# Patient Record
(demographics unavailable — no encounter records)

---

## 2024-12-09 NOTE — PHYSICAL EXAM
[Appropriately responsive] : appropriately responsive [Alert] : alert [No Acute Distress] : no acute distress [No Lymphadenopathy] : no lymphadenopathy [Soft] : soft [Non-tender] : non-tender [Non-distended] : non-distended [No HSM] : No HSM [No Lesions] : no lesions [No Mass] : no mass [Oriented x3] : oriented x3 [Examination Of The Breasts] : a normal appearance [No Masses] : no breast masses were palpable [Labia Majora] : normal [Labia Minora] : normal [Normal] : normal [Moderate] : There was moderate vaginal bleeding

## 2024-12-09 NOTE — HISTORY OF PRESENT ILLNESS
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 This month  received menses  2 x this month, 1.5 weeks later after menses. has h/o  periods with clots heavy and painful and it seems to be returning now after she stopped tamoxifen. She has h/o fibroid and is very worried will start again. She is very tense during gyn exam. Was in ed and had sono - TA - showed  3.9 cm myoma , Never sexually active   H/O DCIS, RADIATION, TAMOXIFEN FOR5 YERAS, WILL SEEE DR PORTILLO THIS MONTH  [TextBox_4] : GYNHX  history of fibroid, cysts, or STDs never a pap  dr Graham 2022 [Mammogramdate] : 5-2024 [PapSmeardate] : 2022 [LMPDate] : 11-

## 2024-12-09 NOTE — DISCUSSION/SUMMARY
[FreeTextEntry1] : 48 yo p0 annual , menorrhagia, h/o fibroids, h/o DCIS , [facility] MRI TO DO for myoma location hormonal w/up day 2-3 mammo options of bleeding management d/w patient including but not limited to: tranexamic acid  d &C hysto with cytotec night before  ablation if endometrial biopsy benign and no intrauterine myoma  ufe hysterectomy will schedule d &C hysteroscopy and pap the time of procedure

## 2024-12-09 NOTE — DISCUSSION/SUMMARY
[FreeTextEntry1] : 46 yo p0 annual , menorrhagia, h/o fibroids, h/o DCIS , [facility] MRI TO DO for myoma location hormonal w/up day 2-3 mammo options of bleeding management d/w patient including but not limited to: tranexamic acid  d &C hysto with cytotec night before  ablation if endometrial biopsy benign and no intrauterine myoma  ufe hysterectomy will schedule d &C hysteroscopy and pap the time of procedure

## 2025-02-19 NOTE — ASSESSMENT
[FreeTextEntry1] : Left breast DCIS  ER/IN pos, s/p L WLE.  Iron deficiency secondary to menorrhagia.   Assessment and Plan: -- She has completed Tamoxifen x 5 years on 10/2024. -- Breast exam today showed stable post surgical change at the lumpectomy site. Reviewed Bilateral screening mammogram on 5/16/2024 which showed no mammographic evidence of malignancy. MR breast on 11/24/24 showed no suspicious findings. She is due for bilateral screening mammo on 5/2025, script given. -- Menorrhagia; transvaginal US showed fibroids. MR pelvis on 12/26/24 showed fibroid uterus. She is scheduled for D&C with Dr Gutierrez on 2/28/25. F/U with Dr Gutierrez as recommended. -- Iron deficiency. S/P Iron infusion on 4/2022. Continue FeSO4 325 mg every other day. Blood work on 1/29/25 reviewed. No need for Iron infusion at this time. Will repeat CBC and Iron studies in 3 months. -- Follow up with Breast Surgeon. -- Follow up with PCP and GYN for health maintenance. -- Advised to f/u with GI for screening colonoscopy. -- RTO for followup in 6 months.

## 2025-02-19 NOTE — HISTORY OF PRESENT ILLNESS
[de-identified] : 41 female was referred by Dr. Rice for consultation of adjuvant endocrine therapy for left breast DCIS.\par  The patient has no major PMH except for hypothyroidism on synthroid , and anxiety on lexapro. PSH negative. FH positive for ovarian cancer in her mother at age 76 ( No genetic tests performed) . She was never pregnant , and she uses OCP x 16-17 years, stopped prior to her surgery, 7/2019 . She has regular menses, LMP 2 weeks ago.\par  \par  Workup as follow :\par  4/22/2019  b/l screening mammogram : heterogenously dense breasts with calcifications in posterior 1/3rd, lateral L breast . no suspicious mass, calcifications or other abnormalities in R. BIRADS 0\par  4/27/19 -- L dx mammogram showed central L breast, posterior depth, heterogenous calcifications in linear distribution, spanning 1.4 cm. BIRADS 4: rec L stereotactic guided biopsy \par  5/16/19 -- L stereotactic guided biopsy  showed DCIS, ER/MI pos.\par  6/5/19 -- b/l breast MRI\par  -R: linear nonmass enhancement in the central R breast, measuring 2.9 cm --> rec BIOPSY \par  -L: clip is laterally displaced from the biopsy site, 2.3 cm from residual calcifications; on region of calcifications, trace residual nonmass enhancement\par  \par  6/13/19 -- R MRI biopsy : benign proliferative type FC changes \par  \par  6/16/19 -- COLOR genetic testing \par  -no mutations identified\par  \par  7/1/19 -- L WLE \par  -DCIS intermediate nuclear grade \par  -ER/MI + (Thai 8/5)\par  -carcinoma is close to inferior and medial surgical margin (0.2 mm)\par  -additional medial margin -- focal ADH \par  -additional inferior margin -- negative for carcinoma \par  \par  The patient has no major complaints today , but reported that she had trace blood on a urine analysis that was obtained 2 weeks ago while she had her period , and she is concerned about bladder cancer . She denies urinary symptoms except for some burning , which resolved now .\par  \par   [de-identified] : 11/11/19 Patient is here today for follow up visit.  She has been on Tamoxifen daily since 10/15/19 and tolerated well. She completed adjuvant whole breast radiation #16 on 9/4/19. She reports lower back pain last week which resolved on its own.  LMP 11/3/19 regular menses.  She is due to have her left mammogram on 1/2020 and MRI breasts on 4/2020.  2/19/2020: TIKI MCNAIR is a 42 year old female here today for follow up visit. She has been taking tamoxifen daily since 10/15/2019. She denies any new breast symptoms at this time. She complains of frequent respiratory infections. She has stopped her birth control after diagnosis as per Dr. Rice. She complains of menorrhagia cycle lasting about 4 days.In addition she complains of fatigue and breast tenderness at site of radiation. Follows up with gyn regularly. New left breast skin lesion at 5 oclock location noted advised; non tender.  5/13/2020: The patient is evaluated via virtual telehealth. She has ER/NY positive DCIS in the breast, s/p lumpectomy in 7/2019. She has sea taking tamoxifen daily since 10/2019 and tolerated well. Her periods are on time and heavy. Blood work from 2/2020 showed normal Hb and normal iron study. She had repeat b/l dx mammo and US on 4/24/2020. There was no suspicious finding. She does not have breast related symptoms. Her father passed away from [place] [id number] and she feels very sad.   8/12/2020: 2/19/2020: TIKI MCNAIR is a 42 year old female here today for follow up visit for ER/NY positive DCIS in the breast, s/p lumpectomy in 7/2019. She has been taking tamoxifen daily since 10/2019. She denies any new breast symptoms at this time. She had repeat b/l dx mammo and US on 4/24/2020. There was no suspicious finding.  MRI completed June 2020 which was unremarkable. She has stopped her birth control after diagnosis as per Dr. Rice. She complains of menorrhagia cycle lasting about 4 days. hgb stable; ferritin normal. Follows up with gyn regularly. In addition, she complains of intermittent pain in the right scapula with belching or hiccup; imaging of the RUQ abdomen was unremarkable.   03/29/2021: TIKI is here for follow up visit for ER/NY positive DCIS in the breast, s/p lumpectomy in 7/2019. She has been taking tamoxifen daily since 10/2019. She is feeling well and does not have new complains. She had b/l dx mammo on 4/27/21 which showed stable postlumpectomy changes and no suspicious finding. She still has menorrhagias lasting about 4 days. She went for a transvaginal sono which showed fibroids. Hb remained within normal range. Serum Ferritin is low at 12 ng/ml. % saturation is borderline at 15%. Serum Fe is normal 73 ug/dl, TIBC is elevated.  She is taking FeSO4 325 mg three times per week.   4/7/22 TIKI is here for follow up visit for ER/NY positive DCIS in the breast, s/p lumpectomy in 7/2019. She has been taking tamoxifen daily since 10/2019. She offers no breast-related complaints. Last mammogram and breast US done 10/2021 which showed no mammographic/sonographic evidence of malignancy. She complains heavier menses monthly, using 24 pads in 4 days. She has been taking iron pills 3x/week. She did not tolerate oral iron supplement well with abdominal upset and occasional diarrhea.   1/16/23 TIKI is here for follow up visit for ER/NY positive DCIS in the breast, s/p lumpectomy in 7/2019. She has been taking tamoxifen daily since 10/2019. She offers no breast-related complaints. Last mammogram and breast US done 4/2022 which showed no mammographic/sonographic evidence of malignancy. She still complains heavier menses monthly, using 24 pads in 4 days. She has been taking iron pills 3x/week. She did not tolerate oral iron supplement well with abdominal upset and occasional diarrhea.   7/3/23 TIKI is here for follow up visit for ER/NY positive DCIS in the left breast, s/p lumpectomy in 7/2019. She has been taking Tamoxifen daily since 10/2019. She offers no breast-related complaints. Last mammogram was done on 5/2023 which showed no mammographic/sonographic evidence of malignancy. She still complains heavier menses monthly, using 24 pads in 4 days. She has not seen gyne in the last 2 1/2 years. She has been taking iron pills 3x/week, not regularly because it causes GI upset. She has increased her intake of Iron rich foods. Last Iron infusion was 4/2022.  2/19/25 TIKI is here for follow up visit for ER/NY positive DCIS in the left breast, s/p lumpectomy in 7/2019. She completed 5 years of Tamoxifen in 10/2024. Bilateral screening mammogram on 5/16/2024 showed no mammographic evidence of malignancy. MR breast on 11/24/24 showed no suspicious findings. She still complains of heavy menses x 4-5 days. During the second month off Tamoxifen, she had menses twice in a month.  She follows up with Dr Gutierrez. MR pelvic was done on 12/26/24 which showed fibroid uterus and she is scheduled for D&C on 2/28/'25. She has been taking iron pills every other day. Blood work results on 1/29/25 reviewed with her. She feels well, denies any new breast symptoms.

## 2025-02-19 NOTE — PHYSICAL EXAM
[Fully active, able to carry on all pre-disease performance without restriction] : Status 0 - Fully active, able to carry on all pre-disease performance without restriction [Normal] : pharynx is unremarkable, moist mucus membrane, no oral lesions [de-identified] : S/P left lumpectomy. No palpable masses in bilateral breasts and no lymphadenopathy noted.

## 2025-02-19 NOTE — CONSULT LETTER
[Dear  ___] : Dear  [unfilled], [Courtesy Letter:] : I had the pleasure of seeing your patient, [unfilled], in my office today. [Please see my note below.] : Please see my note below. [Sincerely,] : Sincerely, [DrMichel  ___] : Dr. WHITMAN [DrMichel ___] : Dr. WHITMAN [FreeTextEntry3] : Elizabeth Rosado MD

## 2025-03-11 NOTE — PLAN
[FreeTextEntry1] : 47-year-old para 0 status post hysteroscopic myomectomy, moderate bleeding Pelvic sono transvaginal-endometrium within normal limits, several myomas, largest 3-4+ Patient to monitor bleeding, if heavy patient to start tranexamic acid CBC anemia workup We discussed possibility of uterine artery embolization/hysterectomy if bleeding cannot be controlled RTO 2 weeks

## 2025-03-26 NOTE — DISCUSSION/SUMMARY
[FreeTextEntry1] : 48 yo p0 h/o fibroids, menorrhagia, h/o DCIS s/p  hysteroscopic myomectomy -Tranexamic acid versus UFE versus robotic hysterectomy discussed this patient She will see how the next menses go She will make appointment for UFE consult She will use tranexamic acid to bleeding heavy

## 2025-03-26 NOTE — HISTORY OF PRESENT ILLNESS
[FreeTextEntry1] : 46 yo p 0 Patient is here for follow/up, s/p hysteroscopic myomectomy, patient had questions re future management.  H/O DCIS, RADIATION, TAMOXIFEN FOR5 YERAS, WILL SEEE DR PORTILLO THIS MONTH Never sexually active [TextBox_4] : GYNHX  history of fibroid, no cysts, or STDs

## 2025-05-21 NOTE — HISTORY OF PRESENT ILLNESS
[FreeTextEntry1] : Yumiko is a 43 F with a screen detected L breast DCIS, ER/RI pos, s/p L WLE on 19.   -s/p L WLE on 19  -s/p L WBI from 19-19 -started tamoxifen on 10/1/19  Her work up was as follows:  19 -- b/l screening mammogram  -heterogenously dense breasts  -calcifications in posterior 1/3rd, lateral L breast  -no suspicious mass, calcifications or other abnormalities in R BIRADS 0  19 -- L dx mammogram  -central L breast, posterior depth, heterogenous calcifications in linear distribution, spanning 1.4 cm BIRADS 4: rec L stereotactic guided biopsy   19 -- L stereotactic guided biopsy  -DCIS -ER/RI pos (Thai 7/5)  19 -- b/l breast MRI -R: linear nonmass enhancement in the central R breast, measuring 2.9 cm --> rec BIOPSY  -L: clip is laterally displaced from the biopsy site, 2.3 cm from residual calcifications; on region of calcifications, trace residual nonmass enhancement BIRADS 4  19 -- R MRI biopsy  -benign proliferative type FC changes   19 -- COLOR genetic testing  -no mutations identified  19 -- L WLE  -DCIS intermediate nuclear grade  -ER/RI + (Thai 8/5) -carcinoma is close to inferior and medial surgical margin (0.2 mm) -additional medial margin -- focal ADH  -additional inferior margin -- negative for carcinoma   She has no breast related complaints at this time.  She denies any breast pain but does get cyclical breast tenderness, has not palpated any new palpable masses in either breast and denies any nipple discharge or retraction.   HISTORICAL RISK FACTORS:  -1 prior breast biopsy as above  -family history of ovarian cancer in her mother, dx at age 76 (genetic testing was never performed) - -OCPs x 16-17 years, stopped prior to her surgery, 2019  ROS: chronic lower back pain  INTERVAL HISTORY:  Yumiko returns for a 6 month follow up visit, s/p L WLE for DCIS on 19.   Her final pathology revealed DCIS, ER/RI pos, surgical margins ultimately negative.   She has since completed L WBI and tolerated it well.  She is currently being maintained on tamoxifen.  She is getting hot flashes and has heavy menstrual bleeding, but denies any spotting.  SHe has a follow up with her gynecologist regarding her heavy menses. Her Hg/Hct is slowly trending down.    She has no breast related complaints at this time.  She denies any breast pain, has not palpated any new palpable masses in either breast and denies any nipple discharge or retraction.  She has been getting dermatitis in her left breast in the area of her surgery/radiation.    Her most recent imaging was performed on 2020, a L dx mammogram and US which revealed post surgical changes and @3N10 a post surgical seroma measuring 9 x 10 x 4 mm, deemed BIRADS 2.    She also had a repeat breast MRI on 2020 which was unrevealing for any suspicious areas of enhancement, deemed BIRADS 2.   INTERVAL HISTORY:  Yumiko returns for a 6 month follow up visit, s/p L WLE for DCIS on 19.   On Tamoxifen   Her most recent imaging 10/26/21  LEFT dx mammo and US -breasts are heterogeneously dense -Linear marker denotes the site of cutaneous scarring in the left breast. There are stable postsurgical distortion  LEFT US: -@ 3N 10 benign post lumpectomy changes. BIRADS2  INTERVAL HISTORY:  2022 Yumiko returns for a short term follow up for L breast DCIS, s/p L WLE on 19.   She has no breast related complaints at this time.  She denies any breast pain, has not palpated any new palpable masses in either breast and denies any nipple discharge or retraction.    Her most recent imaging was a b/l screening mammogram and US ON 2022, which was unrevealing for any suspicious abnormalities within either breast, deemed BIRADS 2.    She is otherwise taking the tamoxifen and tolerating it relatively well. However, since stopping the birth control, her menses have been very heavy, leading to anemia requiring iron infusions.  She was recommended to follow up with her gynecologist for further evaluation.   INTERVAL HISTORY 23 Yumiko is here for her follow up visit  She states she has some breast pain mostly around her period but denies any other breast related symptoms    Her imaging is as follows: 05/15/2023 b/l mammo and US -breasts are heterogeneously dense -an area of architectural distortion at the site of lumpectomy seen in the left breast. BIRADS2  INTERVAL HISTORY 24 Yumiko is here for her follow up visit On Tamoxifen and tolerates it well  She states she has some breast pain but no other breast related complaints   Her imaging is as follows: 2023 b/l breast MRI-->birads1 No MRI evidence of malignancy in either breast.  2024 b/l mammo and US -->birads2 breasts are heterogeneously dense  an area of architectural distortion at the site of lumpectomy seen in the left breast.  INTERVAL HISTORY 25 Yumiko is here for her follow up visit On Tamoxifen and tolerates it well  She states she has some breast pain but no other breast related complaints   Her imaging is as follows: 2024 B/L MRI -->birads1 Heterogeneous fibroglandular tissue No evidence of malignancy in bilateral breasts.  05/10/2025 b/l mammo and US -->BIRADS2  breasts are heterogeneously dense  an area of architectural distortion at the site of lumpectomy seen in the left breast.

## 2025-05-21 NOTE — PHYSICAL EXAM
[Normocephalic] : normocephalic [Atraumatic] : atraumatic [EOMI] : extra ocular movement intact [Examined in the supine and seated position] : examined in the supine and seated position [Symmetrical] : symmetrical [No dominant masses] : no dominant masses in right breast  [No dominant masses] : no dominant masses left breast [No Nipple Retraction] : no left nipple retraction [No Nipple Discharge] : no left nipple discharge [No Axillary Lymphadenopathy] : no left axillary lymphadenopathy [No Edema] : no edema [No Rashes] : no rashes [No Ulceration] : no ulceration [de-identified] : On physical exam, there are no discrete masses in either breast or axilla. There is no nipple discharge or inversion bilaterally. There are no skin changes bilaterally.

## 2025-05-21 NOTE — DATA REVIEWED
[FreeTextEntry1] : CC: [id number]     EXAM:  MR BREAST WAWIC BI W CAD#   ORDERED BY: ANITA VICENTE  PROCEDURE DATE:  11/25/2024    INTERPRETATION:  Clinical history: High-risk screening.  Additional history: history of left breast DCIS postlumpectomy in 2019.  Technique: MRI of both breasts was performed using a dedicated breast coil and 1.5 Clair closed MRI. Axial non fat-suppressed T1, followed by dynamic axial T1 fat-suppressed images during and following administration of gadolinium were obtained. Axial fat suppressed T2 images were also obtained.    Post processing including subtraction, 3-D reconstruction and kinetic enhancement analysis was performed on a dedicated [employer] workstation.  IV Contrast: Gadavist 6.5 cc administered / 1 cc discarded.  Comparison: MRI breast dating back to 2019.  Findings:  Amount of fibroglandular tissue: Heterogeneous fibroglandular tissue  Background parenchymal enhancement: Minimal, Symmetric  RIGHT BREAST: There is no suspicious enhancement in the right breast. There is no evidence of skin thickening or nipple retraction. There is no axillary lymph adenopathy.  LEFT BREAST: There is no suspicious enhancement in the left breast. There is no evidence of skin thickening or nipple retraction. There is no axillary lymph adenopathy.  The imaged portions of the chest and abdomen are unremarkable.  Impression:  No evidence of malignancy in bilateral breasts.  Recommendation: Unless otherwise indicated by clinical findings, annual screening mammography recommended.  BI-RADS Category 1: Negative   [id number]     EXAM:  MG MAMMO SCREEN W TIGRE BI#   ORDERED BY: CARLOS LANTIGUA  PROCEDURE DATE:  05/10/2025    INTERPRETATION:  HISTORY: Bilateral MG MAMMO SCREEN W TIGRE BI# was performed. Patient is 47 years old and is seen for screening.  The patient has a history of left lumpectomy in July, 2019 - malignant and right needle biopsy - benign. The patient has no family history of breast cancer.  RISK ASSESSMENT: Tyrer-Cuzick Lifetime Risk: 11.1  CLINICAL BREAST EXAM: The patient reports their last clinical breast exam was performed within the past year.  COMPARISON STUDIES: The present examination has been compared to prior imaging studies performed at [facility] on 04/27/2021, 10/26/2021, 04/20/2022, 05/15/2023 and 05/16/2024.  MAMMOGRAM FINDINGS: Mammography was performed including the following views: bilateral craniocaudal with tomosynthesis, bilateral mediolateral oblique with tomosynthesis.  The examination includes digital synthetic 2D and digital tomosynthesis 3D images. Additional imaging analysis was performed using CAD (computer-aided detection) software.  The breasts are heterogeneously dense, which may obscure small masses.  There is an area of architectural distortion at the site of lumpectomy seen in the left breast.  No suspicious mass, grouping of calcifications, or other abnormality is identified.  IMPRESSION: There is no mammographic evidence of malignancy.  RECOMMENDATION: Unless otherwise indicated by clinical findings, annual screening mammography recommended.  ASSESSMENT: BI-RADS Category 2:  Benign   [id number]     EXAM:  MG US BREAST COMPLETE BI   ORDERED BY: CARLOS LANTIGUA  PROCEDURE DATE:  05/10/2025    INTERPRETATION:  Clinical history: Dense breast screening  The patient reports her last clinical breast examination was performed within the past.  Family history: None  Comparisons: Priors dating back to 2021.  Findings:  Ultrasound:  Bilateral whole breast ultrasound was performed.  No suspicious solid or cystic masses. No axillary adenopathy.  Impression: No sonographic evidence of malignancy.  Recommendation: Unless otherwise indicated by clinical findings, annual screening mammography recommended.  BI-RADS Category 1: Negative

## 2025-05-21 NOTE — HISTORY OF PRESENT ILLNESS
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   -s/p L WLE on 19  -s/p L WBI from 19-19 -started tamoxifen on 10/1/19  Her work up was as follows:  19 -- b/l screening mammogram  -heterogenously dense breasts  -calcifications in posterior 1/3rd, lateral L breast  -no suspicious mass, calcifications or other abnormalities in R BIRADS 0  19 -- L dx mammogram  -central L breast, posterior depth, heterogenous calcifications in linear distribution, spanning 1.4 cm BIRADS 4: rec L stereotactic guided biopsy   19 -- L stereotactic guided biopsy  -DCIS -ER/MN pos (Thai 7/5)  19 -- b/l breast MRI -R: linear nonmass enhancement in the central R breast, measuring 2.9 cm --> rec BIOPSY  -L: clip is laterally displaced from the biopsy site, 2.3 cm from residual calcifications; on region of calcifications, trace residual nonmass enhancement BIRADS 4  19 -- R MRI biopsy  -benign proliferative type FC changes   19 -- COLOR genetic testing  -no mutations identified  19 -- L WLE  -DCIS intermediate nuclear grade  -ER/MN + (Thai 8/5) -carcinoma is close to inferior and medial surgical margin (0.2 mm) -additional medial margin -- focal ADH  -additional inferior margin -- negative for carcinoma   She has no breast related complaints at this time.  She denies any breast pain but does get cyclical breast tenderness, has not palpated any new palpable masses in either breast and denies any nipple discharge or retraction.   HISTORICAL RISK FACTORS:  -1 prior breast biopsy as above  -family history of ovarian cancer in her mother, dx at age 76 (genetic testing was never performed) - -OCPs x 16-17 years, stopped prior to her surgery, 2019  ROS: chronic lower back pain  INTERVAL HISTORY:  Yumiko returns for a 6 month follow up visit, s/p L WLE for DCIS on 19.   Her final pathology revealed DCIS, ER/MN pos, surgical margins ultimately negative.   She has since completed L WBI and tolerated it well.  She is currently being maintained on tamoxifen.  She is getting hot flashes and has heavy menstrual bleeding, but denies any spotting.  SHe has a follow up with her gynecologist regarding her heavy menses. Her Hg/Hct is slowly trending down.    She has no breast related complaints at this time.  She denies any breast pain, has not palpated any new palpable masses in either breast and denies any nipple discharge or retraction.  She has been getting dermatitis in her left breast in the area of her surgery/radiation.    Her most recent imaging was performed on 2020, a L dx mammogram and US which revealed post surgical changes and @3N10 a post surgical seroma measuring 9 x 10 x 4 mm, deemed BIRADS 2.    She also had a repeat breast MRI on 2020 which was unrevealing for any suspicious areas of enhancement, deemed BIRADS 2.   INTERVAL HISTORY:  Yumiko returns for a 6 month follow up visit, s/p L WLE for DCIS on 19.   On Tamoxifen   Her most recent imaging 10/26/21  LEFT dx mammo and US -breasts are heterogeneously dense -Linear marker denotes the site of cutaneous scarring in the left breast. There are stable postsurgical distortion  LEFT US: -@ 3N 10 benign post lumpectomy changes. BIRADS2  INTERVAL HISTORY:  2022 Yumiko returns for a short term follow up for L breast DCIS, s/p L WLE on 19.   She has no breast related complaints at this time.  She denies any breast pain, has not palpated any new palpable masses in either breast and denies any nipple discharge or retraction.    Her most recent imaging was a b/l screening mammogram and US ON 2022, which was unrevealing for any suspicious abnormalities within either breast, deemed BIRADS 2.    She is otherwise taking the tamoxifen and tolerating it relatively well. However, since stopping the birth control, her menses have been very heavy, leading to anemia requiring iron infusions.  She was recommended to follow up with her gynecologist for further evaluation.   INTERVAL HISTORY 23 Yumiko is here for her follow up visit  She states she has some breast pain mostly around her period but denies any other breast related symptoms    Her imaging is as follows: 05/15/2023 b/l mammo and US -breasts are heterogeneously dense -an area of architectural distortion at the site of lumpectomy seen in the left breast. BIRADS2  INTERVAL HISTORY 24 Yumiko is here for her follow up visit On Tamoxifen and tolerates it well  She states she has some breast pain but no other breast related complaints   Her imaging is as follows: 2023 b/l breast MRI-->birads1 No MRI evidence of malignancy in either breast.  2024 b/l mammo and US -->birads2 breasts are heterogeneously dense  an area of architectural distortion at the site of lumpectomy seen in the left breast.  INTERVAL HISTORY 25 Yumiko is here for her follow up visit On Tamoxifen and tolerates it well  She states she has some breast pain but no other breast related complaints   Her imaging is as follows: 2024 B/L MRI -->birads1 Heterogeneous fibroglandular tissue No evidence of malignancy in bilateral breasts.  05/10/2025 b/l mammo and US -->BIRADS2  breasts are heterogeneously dense  an area of architectural distortion at the site of lumpectomy seen in the left breast.

## 2025-05-21 NOTE — PAST MEDICAL HISTORY
[Menstruating] : The patient is menstruating [Menarche Age ____] : age at menarche was [unfilled] [Definite ___ (Date)] : the last menstrual period was [unfilled] [Normal Amount/Duration] : it was of a normal amount and duration [Regular Cycle Intervals] : have been regular [Total Preg ___] : G[unfilled] [History of Hormone Replacement Treatment] : has no history of hormone replacement treatment [FreeTextEntry5] : denies  [FreeTextEntry6] : denies [FreeTextEntry7] : yes currently x 16-17 years  [FreeTextEntry8] : n/a

## 2025-05-21 NOTE — ASSESSMENT
[FreeTextEntry1] : Yumiko is a 47 premenopausal F with left screen detected DCIS, ER/AZ pos, stage 0 breast cancer, s/p L WLE on 7/1/19.  -s/p L WBI, completed on 9/4/19 -started tamoxifen 10/2019  Her final pathology revealed DCIS, intermediate nuclear grade, ER/AZ pos, negative surgical margins.    On physical exam, there are no discrete masses in either breast or axilla. There is no nipple discharge or inversion bilaterally. There are no skin changes bilaterally.  Her imaging is as follows: 11/25/2024 B/L MRI -->birads1 Heterogeneous fibroglandular tissue No evidence of malignancy in bilateral breasts.  05/10/2025 b/l mammo and US -->BIRADS2  breasts are heterogeneously dense  an area of architectural distortion at the site of lumpectomy seen in the left breast.  In regards to her breast pain, it may be related to fibrocystic changes within her breast that are hormonally influenced. We spoke about possible interventions including evening primrose oil, supportive bras, and decreasing caffeine intake.  Although none of these have been consistently proven to improve breast pain, they may be tried.  If the pain becomes very severe, there have been studies of tamoxifen being effective for the treatment of breast pain, although there are risks with tamoxifen.  At this time she will try supportive measures.   We spoke regarding additional screening testing with MRI This annual screening MRIs would be done in an alternating fashion with her screening mammograms such that an imaging study and clinical breast exam would be performed every 6 months.  The use of MRIs have not been shown to prolong survival, however out of 1000 women screened, an additional 14-15 cancers will be identified.  The use of MRIs, has, however, been shown to increase the number of procedures and additional imaging because although it is a very sensitive test, it is not as specific.  This was discussed with the patient and she would like to proceed with screening MRIs.  This will be scheduled for 11/2023   All of her questions were answered.  She knows to call with any further questions or concerns.  PLAN:  -b/l breast MRI in November 2025 -b/l screening mammogram and US due on 5/17/26 -follow up after    Total time spent on encounter was greater than 30 minutes, which included face to face time with the patient, performing an exam, reviewing previous medical records including imaging/ pathology, documenting in patient record and coordinating care/imaging. Greater than 50% of the encounter was spent on counseling and coordination of her breast issue.

## 2025-05-21 NOTE — DATA REVIEWED
[FreeTextEntry1] : CC: [id number]     EXAM:  MR BREAST WAWIC BI W CAD#   ORDERED BY: ANITA VICENTE  PROCEDURE DATE:  11/25/2024    INTERPRETATION:  Clinical history: High-risk screening.  Additional history: history of left breast DCIS postlumpectomy in 2019.  Technique: MRI of both breasts was performed using a dedicated breast coil and 1.5 Clair closed MRI. Axial non fat-suppressed T1, followed by dynamic axial T1 fat-suppressed images during and following administration of gadolinium were obtained. Axial fat suppressed T2 images were also obtained.    Post processing including subtraction, 3-D reconstruction and kinetic enhancement analysis was performed on a dedicated [employer] workstation.  IV Contrast: Gadavist 6.5 cc administered / 1 cc discarded.  Comparison: MRI breast dating back to 2019.  Findings:  Amount of fibroglandular tissue: Heterogeneous fibroglandular tissue  Background parenchymal enhancement: Minimal, Symmetric  RIGHT BREAST: There is no suspicious enhancement in the right breast. There is no evidence of skin thickening or nipple retraction. There is no axillary lymph adenopathy.  LEFT BREAST: There is no suspicious enhancement in the left breast. There is no evidence of skin thickening or nipple retraction. There is no axillary lymph adenopathy.  The imaged portions of the chest and abdomen are unremarkable.  Impression:  No evidence of malignancy in bilateral breasts.  Recommendation: Unless otherwise indicated by clinical findings, annual screening mammography recommended.  BI-RADS Category 1: Negative   [id number]     EXAM:  MG MAMMO SCREEN W TIGRE BI#   ORDERED BY: CARLOS LANTIGUA  PROCEDURE DATE:  05/10/2025    INTERPRETATION:  HISTORY: Bilateral MG MAMMO SCREEN W TIGRE BI# was performed. Patient is 47 years old and is seen for screening.  The patient has a history of left lumpectomy in July, 2019 - malignant and right needle biopsy - benign. The patient has no family history of breast cancer.  RISK ASSESSMENT: Tyrer-Cuzick Lifetime Risk: 11.1  CLINICAL BREAST EXAM: The patient reports their last clinical breast exam was performed within the past year.  COMPARISON STUDIES: The present examination has been compared to prior imaging studies performed at [facility] on 04/27/2021, 10/26/2021, 04/20/2022, 05/15/2023 and 05/16/2024.  MAMMOGRAM FINDINGS: Mammography was performed including the following views: bilateral craniocaudal with tomosynthesis, bilateral mediolateral oblique with tomosynthesis.  The examination includes digital synthetic 2D and digital tomosynthesis 3D images. Additional imaging analysis was performed using CAD (computer-aided detection) software.  The breasts are heterogeneously dense, which may obscure small masses.  There is an area of architectural distortion at the site of lumpectomy seen in the left breast.  No suspicious mass, grouping of calcifications, or other abnormality is identified.  IMPRESSION: There is no mammographic evidence of malignancy.  RECOMMENDATION: Unless otherwise indicated by clinical findings, annual screening mammography recommended.  ASSESSMENT: BI-RADS Category 2:  Benign   [id number]     EXAM:  MG US BREAST COMPLETE BI   ORDERED BY: CARLOS LANTIGUA  PROCEDURE DATE:  05/10/2025    INTERPRETATION:  Clinical history: Dense breast screening  The patient reports her last clinical breast examination was performed within the past.  Family history: None  Comparisons: Priors dating back to 2021.  Findings:  Ultrasound:  Bilateral whole breast ultrasound was performed.  No suspicious solid or cystic masses. No axillary adenopathy.  Impression: No sonographic evidence of malignancy.  Recommendation: Unless otherwise indicated by clinical findings, annual screening mammography recommended.  BI-RADS Category 1: Negative

## 2025-05-21 NOTE — ASSESSMENT
[FreeTextEntry1] : Yumiko is a 47 premenopausal F with left screen detected DCIS, ER/WI pos, stage 0 breast cancer, s/p L WLE on 7/1/19.  -s/p L WBI, completed on 9/4/19 -started tamoxifen 10/2019  Her final pathology revealed DCIS, intermediate nuclear grade, ER/WI pos, negative surgical margins.    On physical exam, there are no discrete masses in either breast or axilla. There is no nipple discharge or inversion bilaterally. There are no skin changes bilaterally.  Her imaging is as follows: 11/25/2024 B/L MRI -->birads1 Heterogeneous fibroglandular tissue No evidence of malignancy in bilateral breasts.  05/10/2025 b/l mammo and US -->BIRADS2  breasts are heterogeneously dense  an area of architectural distortion at the site of lumpectomy seen in the left breast.  In regards to her breast pain, it may be related to fibrocystic changes within her breast that are hormonally influenced. We spoke about possible interventions including evening primrose oil, supportive bras, and decreasing caffeine intake.  Although none of these have been consistently proven to improve breast pain, they may be tried.  If the pain becomes very severe, there have been studies of tamoxifen being effective for the treatment of breast pain, although there are risks with tamoxifen.  At this time she will try supportive measures.   We spoke regarding additional screening testing with MRI This annual screening MRIs would be done in an alternating fashion with her screening mammograms such that an imaging study and clinical breast exam would be performed every 6 months.  The use of MRIs have not been shown to prolong survival, however out of 1000 women screened, an additional 14-15 cancers will be identified.  The use of MRIs, has, however, been shown to increase the number of procedures and additional imaging because although it is a very sensitive test, it is not as specific.  This was discussed with the patient and she would like to proceed with screening MRIs.  This will be scheduled for 11/2023   All of her questions were answered.  She knows to call with any further questions or concerns.  PLAN:  -b/l breast MRI in November 2025 -b/l screening mammogram and US due on 5/17/26 -follow up after    Total time spent on encounter was greater than 30 minutes, which included face to face time with the patient, performing an exam, reviewing previous medical records including imaging/ pathology, documenting in patient record and coordinating care/imaging. Greater than 50% of the encounter was spent on counseling and coordination of her breast issue.

## 2025-05-21 NOTE — PHYSICAL EXAM
[Normocephalic] : normocephalic [Atraumatic] : atraumatic [EOMI] : extra ocular movement intact [Examined in the supine and seated position] : examined in the supine and seated position [Symmetrical] : symmetrical [No dominant masses] : no dominant masses in right breast  [No dominant masses] : no dominant masses left breast [No Nipple Retraction] : no left nipple retraction [No Nipple Discharge] : no left nipple discharge [No Axillary Lymphadenopathy] : no left axillary lymphadenopathy [No Edema] : no edema [No Rashes] : no rashes [No Ulceration] : no ulceration [de-identified] : On physical exam, there are no discrete masses in either breast or axilla. There is no nipple discharge or inversion bilaterally. There are no skin changes bilaterally.